# Patient Record
Sex: MALE | Race: BLACK OR AFRICAN AMERICAN | NOT HISPANIC OR LATINO | Employment: FULL TIME | URBAN - METROPOLITAN AREA
[De-identification: names, ages, dates, MRNs, and addresses within clinical notes are randomized per-mention and may not be internally consistent; named-entity substitution may affect disease eponyms.]

---

## 2019-12-16 ENCOUNTER — OFFICE VISIT (OUTPATIENT)
Dept: URGENT CARE | Facility: CLINIC | Age: 59
End: 2019-12-16
Payer: OTHER MISCELLANEOUS

## 2019-12-16 VITALS
WEIGHT: 170 LBS | BODY MASS INDEX: 25.76 KG/M2 | DIASTOLIC BLOOD PRESSURE: 80 MMHG | HEIGHT: 68 IN | TEMPERATURE: 98 F | HEART RATE: 70 BPM | RESPIRATION RATE: 16 BRPM | SYSTOLIC BLOOD PRESSURE: 120 MMHG

## 2019-12-16 DIAGNOSIS — S76.012A HIP STRAIN, LEFT, INITIAL ENCOUNTER: Primary | ICD-10-CM

## 2019-12-16 DIAGNOSIS — M25.552 LEFT HIP PAIN: ICD-10-CM
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
## 2019-12-16 PROCEDURE — 99203 OFFICE O/P NEW LOW 30 MIN: CPT | Mod: S$GLB,,, | Performed by: PHYSICIAN ASSISTANT

## 2019-12-16 PROCEDURE — 73502 XR HIP 2 VIEW LEFT: ICD-10-PCS | Mod: FY,LT,S$GLB, | Performed by: SPECIALIST

## 2019-12-16 PROCEDURE — 99203 PR OFFICE/OUTPT VISIT, NEW, LEVL III, 30-44 MIN: ICD-10-PCS | Mod: S$GLB,,, | Performed by: PHYSICIAN ASSISTANT

## 2019-12-16 PROCEDURE — 73502 X-RAY EXAM HIP UNI 2-3 VIEWS: CPT | Mod: FY,LT,S$GLB, | Performed by: SPECIALIST

## 2019-12-16 RX ORDER — METFORMIN HYDROCHLORIDE 1000 MG/1
1000 TABLET ORAL 2 TIMES DAILY WITH MEALS
COMMUNITY

## 2019-12-16 RX ORDER — LISINOPRIL 10 MG/1
10 TABLET ORAL DAILY
COMMUNITY

## 2019-12-16 NOTE — LETTER
[facility]  [place]  [address] [address]  Phone: [phone]  Fax: [phone]  [facility]: [id number]    Pt Name: Yeison Taylor  Injury Date: 12/16/2019   Employee ID: [id number] Date of First Treatment: 12/16/2019   Company: [employer]      Appointment Time: 03:35 PM Arrived: 3:25 PM   Provider: Jose Alejandro Conway PA-C Time Out: 5:00 PM     Office Treatment:   EXAM  X-RAY  REGULAR DUTY    1. Hip strain, left, initial encounter    2. Left hip pain    3. Work related injury          Patient Instructions: Apply ice 24-48 hours then apply heat/warm soaks(Take Aleve, 2 tablets twice daily.)    Restrictions: Regular Duty     Return Appointment: 12/20/2019 at 10:00 AM  IJ

## 2019-12-16 NOTE — PROGRESS NOTES
Subjective:       Patient ID: Yeison Taylor is a 59 y.o. male.    Chief Complaint: Hip Pain (LT)    Pt works for [employer]. 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 IJ    59-year-old male presents with left hip pain that started today. DHRUV as above.  Patient denies previous hip injury.  Patient denies groin or testicular pain.  Patient states he felt sharp pain to the anterior left hip while lifting the printer.  Pain subsided, but he has had a few intermittent sharp pains to the same area over the next several hours.  Patient denies previous left hip injury.  Patient denies abdominal pain, nausea or vomiting.    Hip Pain    The incident occurred 1 to 3 hours ago. The incident occurred at work. Injury mechanism: LIFTING. The pain is present in the left hip. The pain is at a severity of 3/10. The pain is mild. The pain has been constant since onset. Associated symptoms include muscle weakness. Pertinent negatives include no loss of motion or numbness. He reports no foreign bodies present. The symptoms are aggravated by movement. He has tried nothing for the symptoms. The treatment provided no relief.       Constitution: Negative for chills, fatigue and fever.   HENT: Negative for congestion and sore throat.    Neck: Negative for painful lymph nodes.   Cardiovascular: Negative for chest pain and leg swelling.   Eyes: Negative for double vision and blurred vision.   Respiratory: Negative for cough and shortness of breath.    Gastrointestinal: Negative for abdominal trauma, abdominal pain, nausea, vomiting and diarrhea.   Genitourinary: Negative for dysuria, frequency, urgency, genital trauma and testicular pain.   Musculoskeletal: Positive for pain and joint pain. Negative for joint swelling, muscle cramps and muscle ache.   Skin: Negative for color change, pale and rash.   Allergic/Immunologic: Negative for seasonal allergies.   Neurological:  Negative for dizziness, history of vertigo, light-headedness, passing out, headaches and numbness.   Hematologic/Lymphatic: Negative for swollen lymph nodes, easy bruising/bleeding and history of blood clots. Does not bruise/bleed easily.   Psychiatric/Behavioral: Negative for nervous/anxious, sleep disturbance and depression. The patient is not nervous/anxious.         Objective:      Physical Exam   Constitutional: He appears well-developed and well-nourished. He is active. No distress.   HENT:   Head: Normocephalic and atraumatic.   Right Ear: Hearing and external ear normal.   Left Ear: Hearing and external ear normal.   Nose: Nose normal. No nasal deformity. No epistaxis.   Mouth/Throat: Oropharynx is clear and moist and mucous membranes are normal.   Eyes: Conjunctivae and lids are normal. No scleral icterus.   Neck: Trachea normal and normal range of motion.   Cardiovascular: Intact distal pulses and normal pulses.   Pulmonary/Chest: Effort normal. No stridor. No respiratory distress.   Abdominal: Hernia confirmed negative in the right inguinal area and confirmed negative in the left inguinal area.   Genitourinary: Penis normal. Circumcised.   Musculoskeletal:        Left hip: He exhibits normal range of motion, normal strength, no tenderness, no swelling and no deformity.   Neurological: He is alert. He has normal strength. He is not disoriented. No sensory deficit. GCS eye subscore is 4. GCS verbal subscore is 5. GCS motor subscore is 6.   Skin: Skin is warm, dry and intact. Capillary refill takes less than 2 seconds. He is not diaphoretic.   Psychiatric: He has a normal mood and affect. His speech is normal and behavior is normal. He is attentive.   Nursing note and vitals reviewed.        Type of Reading: me.  Radiology Procedure Done: Left Hip.  Interpretation: No acute fractures or dislocation.        Assessment:       1. Hip strain, left, initial encounter    2. Left hip pain    3. Work related injury         Plan:            Patient Instructions: Apply ice 24-48 hours then apply heat/warm soaks(Take Aleve, 2 tablets twice daily.)   Restrictions: Regular Duty  Follow up in about 4 days (around 12/20/2019).

## 2019-12-20 ENCOUNTER — OFFICE VISIT (OUTPATIENT)
Dept: URGENT CARE | Facility: CLINIC | Age: 59
End: 2019-12-20
Payer: OTHER MISCELLANEOUS
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
## 2019-12-20 DIAGNOSIS — M25.552 LEFT HIP PAIN: Primary | ICD-10-CM

## 2019-12-20 DIAGNOSIS — S76.012A HIP STRAIN, LEFT, INITIAL ENCOUNTER: ICD-10-CM

## 2019-12-20 PROCEDURE — 99213 PR OFFICE/OUTPT VISIT, EST, LEVL III, 20-29 MIN: ICD-10-PCS | Mod: S$GLB,,, | Performed by: NURSE PRACTITIONER

## 2019-12-20 PROCEDURE — 99213 OFFICE O/P EST LOW 20 MIN: CPT | Mod: S$GLB,,, | Performed by: NURSE PRACTITIONER

## 2019-12-20 NOTE — LETTER
[facility] [place]  [address] [address]  Phone: [phone]  Fax: [phone]  [facility]: [id number]    Pt Name: Yeison Taylor  Injury Date: 12/16/2019   Employee ID: [id number] Date of Treatment: 12/20/2019   Company: [employer]      Appointment Time: 09:45 AM Arrived: 9:55 am   Provider: JOHNNY Walker Time Out:10:25 am     Office Treatment:  EXAM  DISCHARGED FROM OCC HEALTH   REGULAR DUTY     1. Left hip pain    2. Hip strain, left, initial encounter    3. Work related injury          Patient Instructions: Attention not to aggravate affected area    Restrictions: Regular Duty, Discharged from Occupational Health     Return Appointment:   NONE  IJ

## 2019-12-20 NOTE — PROGRESS NOTES
Subjective:       Patient ID: Yeison Taylor is a 59 y.o. male.    Chief Complaint: Hip Pain (LT)    Pt is returning for a LT HIP injury. Pt states his pain today is a 0/10. His pain has improved a lot since his last visit. IJ        Hip Pain    The incident occurred 3 to 5 days ago. The incident occurred at work. Injury mechanism: LIFTING. The pain is present in the left hip. The pain is at a severity of 0/10. The patient is experiencing no pain. The pain has been constant since onset. Pertinent negatives include no loss of motion, muscle weakness or numbness. He reports no foreign bodies present. The treatment provided mild relief.       Constitution: Negative for chills, fatigue and fever.   HENT: Negative for congestion and sore throat.    Neck: Negative for painful lymph nodes.   Cardiovascular: Negative for chest pain and leg swelling.   Eyes: Negative for double vision and blurred vision.   Respiratory: Negative for cough and shortness of breath.    Gastrointestinal: Negative for abdominal trauma, abdominal pain, nausea, vomiting and diarrhea.   Genitourinary: Negative for dysuria, frequency, urgency, genital trauma and testicular pain.   Musculoskeletal: Positive for pain. Negative for joint pain, joint swelling, muscle cramps and muscle ache.   Skin: Negative for color change, pale and rash.   Allergic/Immunologic: Negative for seasonal allergies.   Neurological: Negative for dizziness, history of vertigo, light-headedness, passing out, headaches and numbness.   Hematologic/Lymphatic: Negative for swollen lymph nodes, easy bruising/bleeding and history of blood clots. Does not bruise/bleed easily.   Psychiatric/Behavioral: Negative for nervous/anxious, sleep disturbance and depression. The patient is not nervous/anxious.         Objective:      Physical Exam   Constitutional: He appears well-developed and well-nourished. He is active. No distress.   HENT:   Head: Normocephalic and atraumatic.   Right Ear:  Hearing and external ear normal.   Left Ear: Hearing and external ear normal.   Nose: Nose normal. No nasal deformity. No epistaxis.   Mouth/Throat: Oropharynx is clear and moist and mucous membranes are normal.   Eyes: Conjunctivae and lids are normal. No scleral icterus.   Neck: Trachea normal and normal range of motion.   Cardiovascular: Intact distal pulses and normal pulses.   Pulmonary/Chest: Effort normal. No stridor. No respiratory distress.   Abdominal: Hernia confirmed negative in the right inguinal area and confirmed negative in the left inguinal area.   Genitourinary: Penis normal. Circumcised.   Musculoskeletal:        Left hip: He exhibits normal range of motion, normal strength, no tenderness, no swelling and no deformity.   Neurological: He is alert. He has normal strength. He is not disoriented. No sensory deficit. GCS eye subscore is 4. GCS verbal subscore is 5. GCS motor subscore is 6.   Skin: Skin is warm, dry and intact. Capillary refill takes less than 2 seconds. He is not diaphoretic.   Psychiatric: He has a normal mood and affect. His speech is normal and behavior is normal. He is attentive.   Nursing note and vitals reviewed.      Assessment:       1. Left hip pain    2. Hip strain, left, initial encounter    3. Work related injury        Plan:       Follow up as needed     Patient Instructions: Attention not to aggravate affected area   Restrictions: Regular Duty, Discharged from Occupational Health  Follow up if symptoms worsen or fail to improve.

## 2022-03-24 ENCOUNTER — OFFICE VISIT (OUTPATIENT)
Dept: URGENT CARE | Facility: CLINIC | Age: 62
End: 2022-03-24
Payer: OTHER MISCELLANEOUS

## 2022-03-24 VITALS
DIASTOLIC BLOOD PRESSURE: 84 MMHG | HEART RATE: 121 BPM | OXYGEN SATURATION: 100 % | HEIGHT: 68 IN | BODY MASS INDEX: 24.25 KG/M2 | SYSTOLIC BLOOD PRESSURE: 138 MMHG | WEIGHT: 160 LBS | TEMPERATURE: 98 F

## 2022-03-24 DIAGNOSIS — Z02.6 ENCOUNTER RELATED TO WORKER'S COMPENSATION CLAIM: Primary | ICD-10-CM

## 2022-03-24 DIAGNOSIS — S23.9XXA SPRAIN, THORACIC: ICD-10-CM

## 2022-03-24 DIAGNOSIS — S33.5XXA LUMBAR SPRAIN, INITIAL ENCOUNTER: ICD-10-CM

## 2022-03-24 PROCEDURE — 80305 DRUG TEST PRSMV DIR OPT OBS: CPT | Mod: S$GLB,,, | Performed by: PREVENTIVE MEDICINE

## 2022-03-24 PROCEDURE — 99214 OFFICE O/P EST MOD 30 MIN: CPT | Mod: S$GLB,,, | Performed by: PREVENTIVE MEDICINE

## 2022-03-24 PROCEDURE — 80305 OOH COLLECTION ONLY DRUG SCREEN: ICD-10-PCS | Mod: S$GLB,,, | Performed by: PREVENTIVE MEDICINE

## 2022-03-24 PROCEDURE — 99214 PR OFFICE/OUTPT VISIT, EST, LEVL IV, 30-39 MIN: ICD-10-PCS | Mod: S$GLB,,, | Performed by: PREVENTIVE MEDICINE
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
## 2022-03-24 NOTE — LETTER
[facility]  [address] [facility] [address] [address]  Phone: [phone]  Fax: [phone]  [facility]: [id number]    Pt Name: Yeison Taylor  Injury Date: 03/18/2022   Employee ID: [id number] Date of First Treatment: 03/24/2022   Company: [employer]      Appointment Time: 12:50 PM Arrived: 1:01 PM   Provider: Bret Bernstein MD Time Out:3:50 PM     Office Treatment:   1. Encounter related to worker's compensation claim    2. Lumbar sprain, initial encounter    3. Sprain, thoracic          Patient Instructions: Daily home exercises/warm soaks      Restrictions: Regular Duty, Discharged from Occupational Health, Home today     Return As Needed